# Patient Record
(demographics unavailable — no encounter records)

---

## 2025-03-27 NOTE — HISTORY OF PRESENT ILLNESS
[Procedure: ___] : Procedure performed: [unfilled]  [Date of Surgery: ___] : Date of Surgery:   [unfilled] [Surgeon Name:   ___] : Surgeon Name: Dr. KRISHNA [de-identified] : 34-year-old female known to me from previous sleeve gastrectomy March 2018.  Was last seen in our practice in 2021 at which point she had demonstrated successful weight loss but complained of significant gastroesophageal reflux.  Workup by that time was otherwise relatively unremarkable.  She responded well to proton pump inhibitors but failed to continue.  As she has not been seen in the last 3 years her symptoms seem to have recurred and now describes more frequent epigastric postprandial discomfort with reflux symptoms.  The patient has not undergone any recent GI workup I recommended she undergo an upper endoscopy as well as a video esophagram to demonstrate esophageal anatomy for signs of reflux, dilatation or recurrent hiatal hernia.  Endoscopy can provide concrete evidence of reflux esophagitis, ulcerations or erosions or any suggestion of potential Mims's esophagitis.  If identified surgical correction can certainly be considered.  This however would likely involve revision of her sleeve gastrectomy to Aguilar-en-Y gastric bypass.  I have reviewed with the patient signs and symptoms of reflux, esophagitis.  We discussed Mims's esophagus with and without dysplasia potential progression to malignancy.  We discussed pertinent surgical anatomy and technique for revision of Sleeve Gastrectomy to RYGB in the event of significant disease.  WIll restart on PPI Rx for routine post op Bariatric Blood work provided. Rx for VE provided and may be performed prior to her f/u visit in one month. Referral given for GI evaluation and EGD as well.  Nutritional and weight loss counseling has been provided. The importance of weight reduction in the treatment of Obesity and its contribution to resolution of obesity related comorbidities has been discussed.  The patient is encouraged to remain calorie conscious and continue a low fat, low carbohydrate, high protein diet. Eat slowly and chew food well.  Avoid eating and drinking simultaneously.  Also, emphasis has been placed on the importance of adequate hydration, multi-vitamin supplementation and exercise.  (15 min)

## 2025-03-27 NOTE — PHYSICAL EXAM
[Obese, well nourished, in no acute distress] : obese, well nourished, in no acute distress [Normal] : affect appropriate [de-identified] : Obese, soft, nontender, nondistended, positive bowel sounds in all four quadrants.  No hernia or masses.

## 2025-03-27 NOTE — PHYSICAL EXAM
[Obese, well nourished, in no acute distress] : obese, well nourished, in no acute distress [Normal] : affect appropriate [de-identified] : Obese, soft, nontender, nondistended, positive bowel sounds in all four quadrants.  No hernia or masses.

## 2025-03-27 NOTE — HISTORY OF PRESENT ILLNESS
[Procedure: ___] : Procedure performed: [unfilled]  [Date of Surgery: ___] : Date of Surgery:   [unfilled] [Surgeon Name:   ___] : Surgeon Name: Dr. KRISHNA [de-identified] : 34-year-old female known to me from previous sleeve gastrectomy March 2018.  Was last seen in our practice in 2021 at which point she had demonstrated successful weight loss but complained of significant gastroesophageal reflux.  Workup by that time was otherwise relatively unremarkable.  She responded well to proton pump inhibitors but failed to continue.  As she has not been seen in the last 3 years her symptoms seem to have recurred and now describes more frequent epigastric postprandial discomfort with reflux symptoms.  The patient has not undergone any recent GI workup I recommended she undergo an upper endoscopy as well as a video esophagram to demonstrate esophageal anatomy for signs of reflux, dilatation or recurrent hiatal hernia.  Endoscopy can provide concrete evidence of reflux esophagitis, ulcerations or erosions or any suggestion of potential Mims's esophagitis.  If identified surgical correction can certainly be considered.  This however would likely involve revision of her sleeve gastrectomy to Aguilar-en-Y gastric bypass.  I have reviewed with the patient signs and symptoms of reflux, esophagitis.  We discussed Mims's esophagus with and without dysplasia potential progression to malignancy.  We discussed pertinent surgical anatomy and technique for revision of Sleeve Gastrectomy to RYGB in the event of significant disease.  WIll restart on PPI Rx for routine post op Bariatric Blood work provided. Rx for VE provided and may be performed prior to her f/u visit in one month. Referral given for GI evaluation and EGD as well.  Nutritional and weight loss counseling has been provided. The importance of weight reduction in the treatment of Obesity and its contribution to resolution of obesity related comorbidities has been discussed.  The patient is encouraged to remain calorie conscious and continue a low fat, low carbohydrate, high protein diet. Eat slowly and chew food well.  Avoid eating and drinking simultaneously.  Also, emphasis has been placed on the importance of adequate hydration, multi-vitamin supplementation and exercise.  (15 min)

## 2025-04-25 NOTE — PHYSICAL EXAM
[Obese, well nourished, in no acute distress] : obese, well nourished, in no acute distress [Normal] : affect appropriate [de-identified] : Obese, soft, nontender, nondistended, positive bowel sounds in all four quadrants.  No hernia or masses.

## 2025-04-25 NOTE — HISTORY OF PRESENT ILLNESS
[de-identified] : Persistent epigastric discomfort postprandially with reflux and occasional vomiting (several times per month) [Procedure: ___] : Procedure performed: [unfilled]  [Date of Surgery: ___] : Date of Surgery:   [unfilled] [Surgeon Name:   ___] : Surgeon Name: Dr. KRISHNA

## 2025-04-25 NOTE — ASSESSMENT
[FreeTextEntry1] : Patient returns with similar complaints of dysphagia, reflux and vomiting.  She has still not yet followed up with her gastroenterologist for upper endoscopy as previously recommended.  Video esophagram performed today showed contrast flowing unimpeded through the esophagus and into the gastric sleeve.  Normal esophageal peristalsis.  Suggestion of a small sliding-type hiatal hernia with a short ringlike smooth persistent narrowing at the junction of the esophagus suggestive of possible Schatzki's ring.  The gastric sleeve appeared otherwise intact with typical tubular configuration.  These results have been reviewed with the patient. No acute surgical intervention is indicated however I have again recommended she follow-up with her gastroenterologist for EGD.  Nutritional and weight loss counseling has been provided. The importance of weight reduction in the treatment of Obesity and its contribution to resolution of obesity related comorbidities has been discussed.  The patient is encouraged to remain calorie conscious and continue a low fat, low carbohydrate, high protein diet. Eat slowly and chew food well.  Avoid eating and drinking simultaneously.  Also, emphasis has been placed on the importance of adequate hydration, multi-vitamin supplementation and exercise.  (15 min)  Follow-up upon completion of the endoscopy

## 2025-05-12 NOTE — HISTORY OF PRESENT ILLNESS
[FreeTextEntry1] : 34-year-old woman s/p Sleeve Gastrectomy with HH repair 3/2018, iron deficiency anemia who presents as directed by her bariatric surgeon for reflux/vomiting and dysphagia.       Initial office visit 8/2020: July 23, 2020 her hemoglobin was 10.0 with a hematocrit of 33.9. The patient reports having heartburn on a regular basis. She had her sleeve gastrectomy in 2018. Prior to her gastrectomy she underwent an upper endoscopy and was found to have esophagitis. Esophagitis was rated as LA grade A. There was also small tongue of salmon-colored mucosa 0.5 cm - biopsies were taken to rule out Mims's esophagus. A few erosions were noted in the duodenum.  GE junction biopsies revealed active cardio esophagitis negative for intestinal metaplasia. Gastric biopsies showed an active chronic gastritis negative for H. pylori bacteria. She says since her gastric sleeve she has noticed that she's more constipated. She has a bowel movement every other day. She also reports having left lower quadrant discomfort since the past couple of months. She denies any other symptoms such as nausea, vomiting, dysphagia and odynophagia. She reports having heavy menstrual periods. She is currently waiting to see a gynecologist. He Patient reports that her grandfather had prostate cancer. She denies any gastrointestinal cancer in the family. She's never had a colonoscopy before.  Office visit 9/2021:  Has heartburn everyday since worsening since past 6 months - takes Tums occasionally. Patient denies having difficulty swallowing, painful swallowing, nausea, vomiting, loss of appetite, weight loss, hematochezia. Stool can be dark green. Sometimes has lower abdominal pain. Patient denies having constipation, diarrhea. Taking oral iron since past year - reports levels have improved. Blood test in May of 2021 H/H was 11.4/36.1. Lost 140 lbs after Gastric sleeve - gained back 30 lbs. Continues to have heavy menstrual - changes maxi pad 4 to 5 times a day - follows with GYN. Maternal grandfather had colon cancer at 68. Patient denies family history of other GI cancers.
